# Patient Record
Sex: MALE | Race: WHITE
[De-identification: names, ages, dates, MRNs, and addresses within clinical notes are randomized per-mention and may not be internally consistent; named-entity substitution may affect disease eponyms.]

---

## 2018-08-10 NOTE — ER DOCUMENT REPORT
ED General





- General


Chief Complaint: Cough


Stated Complaint: DIZZY


Time Seen by Provider: 08/10/18 17:09


Mode of Arrival: Ambulatory


Notes: 





Patient is a 67-year-old male with a past medical history of sarcoidosis who 

presents with 2 weeks of a persistent, nonproductive cough.  The patient was 

seen in urgent care approximately 1 week ago, started on steroids and an 

antibiotic but notes that this has not improved his cough.  The patient 

describes the cough as being a pervasive, persistent cough but denies any 

associated sputum production, shortness of breath, or chest pain.  He states 

that he has had similar symptoms in the past with bronchitis.  The patient does 

also report that he has been taking albuterol and Symbicort without any 

significant improvement of his symptoms.  He denies any fever or constitutional 

symptoms.


TRAVEL OUTSIDE OF THE [place] IN LAST 30 DAYS: No





- Related Data


Allergies/Adverse Reactions: 


 





No Known Allergies Allergy (Verified 08/10/18 16:08)


 











Past Medical History





- General


Information source: Patient





- Social History


Smoking Status: Former Smoker


Chew tobacco use (# tins/day): No


Frequency of alcohol use: Occasional


Drug Abuse: None


Lives with: Family


Family History: Reviewed & Not Pertinent


Patient has suicidal ideation: No


Patient has homicidal ideation: No





- Past Medical History


Cardiac Medical History: Reports: Hx Hypercholesterolemia, Hx Hypertension


Pulmonary Medical History: Reports: Hx COPD, Hx Pneumonia


Renal/ Medical History: Denies: Hx Peritoneal Dialysis


Psychiatric Medical History: Reports: Hx Depression, Hx Schizophrenia


Past Surgical History: Reports: Hx Cholecystectomy, Hx Herniorrhaphy





- Immunizations


Hx Pneumococcal Vaccination: 01/01/12





Review of Systems





- Review of Systems


Notes: 





Constitutional: Negative for fever.


HENT: Negative for sore throat.


Eyes: Negative for visual changes.


Cardiovascular: Negative for chest pain.


Respiratory: Positive for persistent cough


Gastrointestinal: Negative for abdominal pain, vomiting or diarrhea.


Genitourinary: Negative for dysuria.


Musculoskeletal: Negative for back pain.


Skin: Negative for rash.


Neurological: Negative for headaches, weakness or numbness.





10 point ROS negative except as marked above and in HPI.





Physical Exam





- Vital signs


Vitals: 


 











Temp Pulse Resp BP Pulse Ox


 


 99.2 F   78   20   140/87 H  96 


 


 08/10/18 16:20  08/10/18 16:20  08/10/18 16:20  08/10/18 16:20  08/10/18 16:20











Interpretation: Normal


Notes: 





PHYSICAL EXAMINATION:





GENERAL: Well-appearing, well-nourished and in no acute distress.





HEAD: Atraumatic, normocephalic.





EYES: Pupils equal round and reactive to light, extraocular movements intact, 

sclera anicteric, conjunctiva are normal.





ENT: nares patent, oropharynx clear without exudates.  Moist mucous membranes.





NECK: Normal range of motion, supple without lymphadenopathy





LUNGS: Breath sounds clear to auscultation bilaterally and equal.  Faint end 

expiratory wheezing in all lung fields





HEART: Regular rate and rhythm without murmurs





ABDOMEN: Soft, nontender, normoactive bowel sounds.  No guarding, no rebound.  

No masses appreciated.





EXTREMITIES: Normal range of motion, no pitting or edema.  No cyanosis.





NEUROLOGICAL: No focal neurological deficits. Moves all extremities 

spontaneously and on command.





PSYCH: Normal mood, normal affect.





SKIN: Warm, Dry, normal turgor, no rashes or lesions noted.





Course





- Re-evaluation


Re-evalutation: 





08/10/18 20:20


Patient presents with a clinical history and exam most consistent with an acute 

viral bronchitis.  Patient is overall well in appearance without tachypnea, 

hypoxemia, tachycardia, or difficulty with ambulation.  Breath sounds are clear 

bilaterally.  No fever.  Patient does have additional signs of upper 

respiratory infection including nasal congestion, sore throat, and sinus 

pressure.  Patient does have a history of sarcoidosis which is noted on chest x-

ray.  He has already been started on a steroid burst by his primary care 

doctor.  He also ready has albuterol inhalers at home.  He is currently on oral 

antibiotics but does not recall the name.  His chest x-ray does not show an 

acute pneumonia and his clinical history is most consistent with a viral lower 

respiratory tract infection superimposed on underlying sarcoidosis.  He is 

saturating 97% on room air, and not tachypnea, very well in appearance.  He has 

had improvement of his cough after receiving Tessalon and lidocaine nebulizer.  

At this time will discharge with return precautions and follow-up 

recommendations.  Verbal discharge instructions given a the bedside and 

opportunity for questions given. Medication warnings reviewed. Patient is in 

agreement with this plan and has verbalized understanding of return precautions 

and the need for primary care follow-up in the next 24-72 hours.


08/11/18 03:38








- Vital Signs


Vital signs: 


 











Temp Pulse Resp BP Pulse Ox


 


 97.6 F   71   16   150/88 H  100 


 


 08/10/18 21:00  08/10/18 21:00  08/10/18 21:00  08/10/18 21:00  08/10/18 21:00














- Laboratory


Result Diagrams: 


 08/10/18 17:20





 08/10/18 17:20


Laboratory results interpreted by me: 


 











  08/10/18 08/10/18





  17:20 17:20


 


WBC  11.1 H 


 


RDW  14.8 H 


 


Monocytes %  14.8 H 


 


Absolute Monocytes  1.6 H 


 


BUN   34 H


 


Creatinine   1.45 H


 


Est GFR ( Amer)   59 L


 


Est GFR (Non-Af Amer)   49 L














- Diagnostic Test


Radiology reviewed: Image reviewed, Reports reviewed


Radiology results interpreted by me: 





08/10/18 20:21


Chest x-ray: Scattered nodularities, no acute infiltrate





Discharge





- Discharge


Clinical Impression: 


 Persistent cough, Sarcoidosis





Acute bronchitis


Qualifiers:


 Bronchitis organism: unspecified organism Qualified Code(s): J20.9 - Acute 

bronchitis, unspecified





Condition: Good


Disposition: HOME, SELF-CARE


Additional Instructions: 


You were seen for symptoms most consistent with bronchitis. This can take up to 

12 weeks to fully resolve. This is generally due to a viral infection. Please 

follow-up with your primary doctor in the next 2-3 days. Return if you develop 

worsening cough, vomiting, fever >100.4, pass out, begin coughing blood, or 

have any other symptoms that are concerning to you. Please use the medications 

prescribed today as directed.


Referrals: 


MARK MEAD DO [Primary Care Provider] - Follow up as needed

## 2018-08-10 NOTE — RADIOLOGY REPORT (SQ)
EXAM DESCRIPTION:  CHEST 2 VIEWS



COMPLETED DATE/TIME:  8/10/2018 5:59 pm



REASON FOR STUDY:  cough



COMPARISON:  6/30/2015



EXAM PARAMETERS:  NUMBER OF VIEWS: two views

TECHNIQUE: Digital Frontal and Lateral radiographic views of the chest acquired.

RADIATION DOSE: NA

LIMITATIONS: none



FINDINGS:  LUNGS AND PLEURA: No acute opacities, masses or pneumothorax.  Similar coarse interstitial
 changes -parenchymal scarring -nodularity compared to the prior study.  No pleural effusion.

MEDIASTINUM AND HILAR STRUCTURES: Stable, multiple calcified lymph nodes.  .

HEART AND VASCULAR STRUCTURES: Heart normal size.  No evidence for failure.

BONES: No acute findings.

HARDWARE: None in the chest.

OTHER: No other significant finding.



IMPRESSION:  NO ACUTE RADIOGRAPHIC FINDING IN THE CHEST.Similar coarse interstitial changes -parenchy
mal scarring -nodularity compared to the prior study.



TECHNICAL DOCUMENTATION:  JOB ID:  [id number]

[id number]

 2011 [employer]- All Rights Reserved



Reading location - IP/workstation name: [facility]

## 2019-12-13 ENCOUNTER — HOSPITAL ENCOUNTER (EMERGENCY)
Dept: HOSPITAL 62 - ER | Age: 69
Discharge: HOME | End: 2019-12-13
Payer: OTHER GOVERNMENT

## 2019-12-13 VITALS — SYSTOLIC BLOOD PRESSURE: 147 MMHG | DIASTOLIC BLOOD PRESSURE: 98 MMHG

## 2019-12-13 DIAGNOSIS — J18.9: Primary | ICD-10-CM

## 2019-12-13 DIAGNOSIS — I10: ICD-10-CM

## 2019-12-13 DIAGNOSIS — E78.00: ICD-10-CM

## 2019-12-13 DIAGNOSIS — R68.83: ICD-10-CM

## 2019-12-13 DIAGNOSIS — Z90.49: ICD-10-CM

## 2019-12-13 LAB
ADD MANUAL DIFF: NO
ALBUMIN SERPL-MCNC: 4 G/DL (ref 3.5–5)
ALP SERPL-CCNC: 95 U/L (ref 38–126)
ANION GAP SERPL CALC-SCNC: 9 MMOL/L (ref 5–19)
APPEARANCE UR: CLEAR
APTT PPP: YELLOW S
AST SERPL-CCNC: 27 U/L (ref 17–59)
BASOPHILS # BLD AUTO: 0 10^3/UL (ref 0–0.2)
BASOPHILS NFR BLD AUTO: 0.7 % (ref 0–2)
BILIRUB DIRECT SERPL-MCNC: 0.2 MG/DL (ref 0–0.4)
BILIRUB SERPL-MCNC: 0.4 MG/DL (ref 0.2–1.3)
BILIRUB UR QL STRIP: NEGATIVE
BUN SERPL-MCNC: 9 MG/DL (ref 7–20)
CALCIUM: 8.8 MG/DL (ref 8.4–10.2)
CHLORIDE SERPL-SCNC: 101 MMOL/L (ref 98–107)
CK MB SERPL-MCNC: 2.44 NG/ML (ref ?–4.55)
CK SERPL-CCNC: 169 U/L (ref 55–170)
CO2 SERPL-SCNC: 31 MMOL/L (ref 22–30)
EOSINOPHIL # BLD AUTO: 0.3 10^3/UL (ref 0–0.6)
EOSINOPHIL NFR BLD AUTO: 4.3 % (ref 0–6)
ERYTHROCYTE [DISTWIDTH] IN BLOOD BY AUTOMATED COUNT: 14.5 % (ref 11.5–14)
GLUCOSE SERPL-MCNC: 108 MG/DL (ref 75–110)
GLUCOSE UR STRIP-MCNC: NEGATIVE MG/DL
HCT VFR BLD CALC: 40.7 % (ref 37.9–51)
HGB BLD-MCNC: 13.5 G/DL (ref 13.5–17)
KETONES UR STRIP-MCNC: NEGATIVE MG/DL
LYMPHOCYTES # BLD AUTO: 1.3 10^3/UL (ref 0.5–4.7)
LYMPHOCYTES NFR BLD AUTO: 19 % (ref 13–45)
MCH RBC QN AUTO: 29.8 PG (ref 27–33.4)
MCHC RBC AUTO-ENTMCNC: 33.2 G/DL (ref 32–36)
MCV RBC AUTO: 90 FL (ref 80–97)
MONOCYTES # BLD AUTO: 1.1 10^3/UL (ref 0.1–1.4)
MONOCYTES NFR BLD AUTO: 15.6 % (ref 3–13)
NEUTROPHILS # BLD AUTO: 4.1 10^3/UL (ref 1.7–8.2)
NEUTS SEG NFR BLD AUTO: 60.4 % (ref 42–78)
NITRITE UR QL STRIP: NEGATIVE
PH UR STRIP: 6 [PH] (ref 5–9)
PLATELET # BLD: 243 10^3/UL (ref 150–450)
POTASSIUM SERPL-SCNC: 3.5 MMOL/L (ref 3.6–5)
PROT SERPL-MCNC: 7 G/DL (ref 6.3–8.2)
PROT UR STRIP-MCNC: NEGATIVE MG/DL
RBC # BLD AUTO: 4.54 10^6/UL (ref 4.35–5.55)
SP GR UR STRIP: 1.01
TOTAL CELLS COUNTED % (AUTO): 100 %
TROPONIN I SERPL-MCNC: < 0.012 NG/ML
UROBILINOGEN UR-MCNC: 2 MG/DL (ref ?–2)
WBC # BLD AUTO: 6.8 10^3/UL (ref 4–10.5)

## 2019-12-13 PROCEDURE — 82550 ASSAY OF CK (CPK): CPT

## 2019-12-13 PROCEDURE — 85025 COMPLETE CBC W/AUTO DIFF WBC: CPT

## 2019-12-13 PROCEDURE — 96365 THER/PROPH/DIAG IV INF INIT: CPT

## 2019-12-13 PROCEDURE — 80053 COMPREHEN METABOLIC PANEL: CPT

## 2019-12-13 PROCEDURE — 36415 COLL VENOUS BLD VENIPUNCTURE: CPT

## 2019-12-13 PROCEDURE — 99285 EMERGENCY DEPT VISIT HI MDM: CPT

## 2019-12-13 PROCEDURE — 81001 URINALYSIS AUTO W/SCOPE: CPT

## 2019-12-13 PROCEDURE — 96375 TX/PRO/DX INJ NEW DRUG ADDON: CPT

## 2019-12-13 PROCEDURE — 82553 CREATINE MB FRACTION: CPT

## 2019-12-13 PROCEDURE — 93010 ELECTROCARDIOGRAM REPORT: CPT

## 2019-12-13 PROCEDURE — 94640 AIRWAY INHALATION TREATMENT: CPT

## 2019-12-13 PROCEDURE — 93005 ELECTROCARDIOGRAM TRACING: CPT

## 2019-12-13 PROCEDURE — 84484 ASSAY OF TROPONIN QUANT: CPT

## 2019-12-13 PROCEDURE — 71045 X-RAY EXAM CHEST 1 VIEW: CPT

## 2019-12-13 NOTE — ER DOCUMENT REPORT
ED General





- General


Chief Complaint: Breathing Difficulty


Stated Complaint: CHEST PAIN


Time Seen by Provider: 12/13/19 14:37


Primary Care Provider: 


[facility] [Primary Care Provider] - Follow up as needed


Mode of Arrival: Ambulatory


Information source: Patient


Notes: 





Patient is 68-year-old male patient with history of COPD and pneumonia 

presenting with cough, congestion and chills at home.  Patient reports he thinks

he may have pneumonia.  Patient reports he is usually seen at the [facility] as he lives in the area however he states he was down here for an appointment

and to visit some family when he went to the urgent care to be checked out and 

they sent him here.





TRAVEL OUTSIDE OF THE [place] IN LAST 30 DAYS: No





- Related Data


Allergies/Adverse Reactions: 


                                        





No Known Allergies Allergy (Verified 12/13/19 12:36)


   











Past Medical History





- General


Information source: Patient





- Social History


Smoking Status: Former Smoker


Family History: Reviewed & Not Pertinent


Patient has suicidal ideation: No


Patient has homicidal ideation: No





- Past Medical History


Cardiac Medical History: Reports: Hx Hypercholesterolemia, Hx Hypertension


Pulmonary Medical History: Reports: Hx COPD, Hx Pneumonia


Renal/ Medical History: Denies: Hx Peritoneal Dialysis


Psychiatric Medical History: Reports: Hx Depression, Hx Schizophrenia


Past Surgical History: Reports: Hx Abdominal Surgery - hernia repair, Hx 

Cholecystectomy, Hx Herniorrhaphy





- Immunizations


Hx Pneumococcal Vaccination: 01/01/12





Review of Systems





- Review of Systems


Constitutional: Chills


EENT: No symptoms reported


Cardiovascular: No symptoms reported


Respiratory: Cough, Short of breath


Gastrointestinal: No symptoms reported


Genitourinary: No symptoms reported


Male Genitourinary: No symptoms reported


Musculoskeletal: No symptoms reported


Skin: No symptoms reported


Hematologic/Lymphatic: No symptoms reported


Neurological/Psychological: No symptoms reported





Physical Exam





- Vital signs


Vitals: 


                                        











Temp Pulse Resp BP Pulse Ox


 


 98.3 F   75   20   122/79   94 


 


 12/13/19 12:36  12/13/19 12:36  12/13/19 12:36  12/13/19 12:36  12/13/19 12:36














- Notes


Notes: 





PHYSICAL EXAMINATION:





GENERAL: Well-appearing, well-nourished and in no acute distress.





HEAD: Atraumatic, normocephalic.





EYES: Pupils equal round and reactive to light, extraocular movements intact, 

sclera anicteric, conjunctiva are normal.





ENT: Nares patent, oropharynx clear without exudates.  Moist mucous membranes.





NECK: Normal range of motion, supple without lymphadenopathy





LUNGS: Expiratory and expiratory wheezes noted, increased work of breathing 

noted.





HEART: Regular rate and rhythm without murmurs





ABDOMEN: Soft, nontender, nondistended abdomen.  No guarding, no rebound.  No 

masses appreciated.





Musculoskeletal: Normal range of motion, no pitting or edema.  No cyanosis.





NEUROLOGICAL: Cranial nerves grossly intact.  Normal speech, normal gait.  

Normal sensory, motor exams 





PSYCH: Normal mood, normal affect.





SKIN: Warm, Dry, normal turgor, no rashes or lesions noted.





Course





- Re-evaluation


Re-evalutation: 








Laboratory











  12/13/19 12/13/19 12/13/19





  12:52 12:52 12:52


 


WBC  6.8  


 


RBC  4.54  


 


Hgb  13.5  


 


Hct  40.7  


 


MCV  90  


 


MCH  29.8  


 


MCHC  33.2  


 


RDW  14.5 H  


 


Plt Count  243  


 


Lymph % (Auto)  19.0  


 


Mono % (Auto)  15.6 H  


 


Eos % (Auto)  4.3  


 


Baso % (Auto)  0.7  


 


Absolute Neuts (auto)  4.1  


 


Absolute Lymphs (auto)  1.3  


 


Absolute Monos (auto)  1.1  


 


Absolute Eos (auto)  0.3  


 


Absolute Basos (auto)  0.0  


 


Seg Neutrophils %  60.4  


 


Sodium   141.3 


 


Potassium   3.5 L 


 


Chloride   101 


 


Carbon Dioxide   31 H 


 


Anion Gap   9 


 


BUN   9 


 


Creatinine   1.26 H 


 


Est GFR ( Amer)   > 60 


 


Est GFR (MDRD) Non-Af   57 L 


 


Glucose   108 


 


Calcium   8.8 


 


Total Bilirubin   0.4 


 


Direct Bilirubin   0.2 


 


Neonat Total Bilirubin   Not Reportable 


 


Neonat Direct Bilirubin   Not Reportable 


 


Neonat Indirect Bili   Not Reportable 


 


AST   27 


 


ALT   24 


 


Alkaline Phosphatase   95 


 


Creatine Kinase   169 


 


CK-MB (CK-2)    2.44


 


Troponin I    < 0.012


 


Total Protein   7.0 


 


Albumin   4.0 


 


Urine Color   


 


Urine Appearance   


 


Urine pH   


 


Ur Specific Gravity   


 


Urine Protein   


 


Urine Glucose (UA)   


 


Urine Ketones   


 


Urine Blood   


 


Urine Nitrite   


 


Urine Bilirubin   


 


Urine Urobilinogen   


 


Ur Leukocyte Esterase   


 


Urine WBC (Auto)   


 


Urine Mucus (Auto)   


 


Urine Ascorbic Acid   














  12/13/19 12/13/19





  14:20 16:20


 


WBC  


 


RBC  


 


Hgb  


 


Hct  


 


MCV  


 


MCH  


 


MCHC  


 


RDW  


 


Plt Count  


 


Lymph % (Auto)  


 


Mono % (Auto)  


 


Eos % (Auto)  


 


Baso % (Auto)  


 


Absolute Neuts (auto)  


 


Absolute Lymphs (auto)  


 


Absolute Monos (auto)  


 


Absolute Eos (auto)  


 


Absolute Basos (auto)  


 


Seg Neutrophils %  


 


Sodium  


 


Potassium  


 


Chloride  


 


Carbon Dioxide  


 


Anion Gap  


 


BUN  


 


Creatinine  


 


Est GFR ( Amer)  


 


Est GFR (MDRD) Non-Af  


 


Glucose  


 


Calcium  


 


Total Bilirubin  


 


Direct Bilirubin  


 


Neonat Total Bilirubin  


 


Neonat Direct Bilirubin  


 


Neonat Indirect Bili  


 


AST  


 


ALT  


 


Alkaline Phosphatase  


 


Creatine Kinase  


 


CK-MB (CK-2)  


 


Troponin I   < 0.012


 


Total Protein  


 


Albumin  


 


Urine Color  YELLOW 


 


Urine Appearance  CLEAR 


 


Urine pH  6.0 


 


Ur Specific Gravity  1.006 


 


Urine Protein  NEGATIVE 


 


Urine Glucose (UA)  NEGATIVE 


 


Urine Ketones  NEGATIVE 


 


Urine Blood  NEGATIVE 


 


Urine Nitrite  NEGATIVE 


 


Urine Bilirubin  NEGATIVE 


 


Urine Urobilinogen  2.0 H 


 


Ur Leukocyte Esterase  NEGATIVE 


 


Urine WBC (Auto)  0 


 


Urine Mucus (Auto)  RARE 


 


Urine Ascorbic Acid  NEGATIVE 











                                        





Chest X-Ray  12/13/19 14:23


IMPRESSION:  Chronic bilateral pleural and parenchymal changes.  There is focal 

increased opacity in the right midlung field which could represent some superim

posed acute pneumonia.


 








Patient reports significant improvement of his symptoms after administration of 

IV solumedrol and breathing treatments.  He was ambulated with a pulse ox and 

maintained O2 saturations in the low 90s.  He does have a history of COPD.  He 

appears much improved.  I think he is appropriate to try outpatient antibiotic 

therapy for this pneumonia.  He was given very strict ED return precautions, 

patient is agreeable to this plan.  Patient will be discharged home at this time

.








- Vital Signs


Vital signs: 


                                        











Temp Pulse Resp BP Pulse Ox


 


 99.3 F   75   15   147/98 H  92 


 


 12/13/19 19:01  12/13/19 12:46  12/13/19 19:01  12/13/19 19:01  12/13/19 19:01














- Laboratory


Result Diagrams: 


                                 12/13/19 12:52





                                 12/13/19 12:52


Laboratory results interpreted by me: 


                                        











  12/13/19 12/13/19 12/13/19





  12:52 12:52 14:20


 


RDW  14.5 H  


 


Mono % (Auto)  15.6 H  


 


Potassium   3.5 L 


 


Carbon Dioxide   31 H 


 


Creatinine   1.26 H 


 


Est GFR (MDRD) Non-Af   57 L 


 


Urine Urobilinogen    2.0 H














Discharge





- Discharge


Clinical Impression: 


Pneumonia


Qualifiers:


 Pneumonia type: due to unspecified organism Laterality: unspecified laterality 

Lung location: unspecified part of lung Qualified Code(s): J18.9 - Pneumonia, 

unspecified organism





Condition: Stable


Disposition: HOME, SELF-CARE


Additional Instructions: 


You have been diagnosed with a pneumonia.  It is very important that you take 

all of your antibiotics until they are gone even if you are feeling better.  

Please return to the emergency department immediately if you began having 

worsening shortness of breath, become confused, have worsening pain, pass out, 

have persistent vomiting that prevents you from being able to drink fluids for 

more than 12 hours, or have any other symptoms that are worrisome to you.  

Please follow-up with your primary care doctor in the next 1-2 days.


Prescriptions: 


Prednisone [Deltasone 20 mg Tablet] 2 tab PO DAILY 5 Days #10 tablet


Levofloxacin [Levaquin 750 mg Tablet] 750 mg PO DAILY #5 tablet


Referrals: 


[facility] [Primary Care Provider] - Follow up as needed